# Patient Record
Sex: FEMALE | Race: WHITE | NOT HISPANIC OR LATINO | Employment: OTHER | URBAN - METROPOLITAN AREA
[De-identification: names, ages, dates, MRNs, and addresses within clinical notes are randomized per-mention and may not be internally consistent; named-entity substitution may affect disease eponyms.]

---

## 2017-03-28 ENCOUNTER — LAB (OUTPATIENT)
Dept: LAB | Age: 77
End: 2017-03-28
Payer: COMMERCIAL

## 2017-03-28 ENCOUNTER — TRANSCRIBE ORDERS (OUTPATIENT)
Dept: LAB | Age: 77
End: 2017-03-28

## 2017-03-28 DIAGNOSIS — I10 ESSENTIAL HYPERTENSION, MALIGNANT: Primary | ICD-10-CM

## 2017-03-28 DIAGNOSIS — E03.9 UNSPECIFIED HYPOTHYROIDISM: ICD-10-CM

## 2017-03-28 DIAGNOSIS — R73.09 OTHER ABNORMAL GLUCOSE: ICD-10-CM

## 2017-03-28 LAB
ALBUMIN SERPL BCP-MCNC: 3.5 G/DL (ref 3.5–5)
ALP SERPL-CCNC: 82 U/L (ref 46–116)
ALT SERPL W P-5'-P-CCNC: 23 U/L (ref 12–78)
ANION GAP SERPL CALCULATED.3IONS-SCNC: 6 MMOL/L (ref 4–13)
AST SERPL W P-5'-P-CCNC: 13 U/L (ref 5–45)
BASOPHILS # BLD AUTO: 0.06 THOUSANDS/ΜL (ref 0–0.1)
BASOPHILS NFR BLD AUTO: 1 % (ref 0–1)
BILIRUB SERPL-MCNC: 0.65 MG/DL (ref 0.2–1)
BUN SERPL-MCNC: 18 MG/DL (ref 5–25)
CALCIUM SERPL-MCNC: 9.2 MG/DL (ref 8.3–10.1)
CHLORIDE SERPL-SCNC: 102 MMOL/L (ref 100–108)
CHOLEST SERPL-MCNC: 186 MG/DL (ref 50–200)
CO2 SERPL-SCNC: 32 MMOL/L (ref 21–32)
CREAT SERPL-MCNC: 0.8 MG/DL (ref 0.6–1.3)
EOSINOPHIL # BLD AUTO: 0.27 THOUSAND/ΜL (ref 0–0.61)
EOSINOPHIL NFR BLD AUTO: 4 % (ref 0–6)
ERYTHROCYTE [DISTWIDTH] IN BLOOD BY AUTOMATED COUNT: 14.7 % (ref 11.6–15.1)
EST. AVERAGE GLUCOSE BLD GHB EST-MCNC: 128 MG/DL
GFR SERPL CREATININE-BSD FRML MDRD: >60 ML/MIN/1.73SQ M
GLUCOSE P FAST SERPL-MCNC: 105 MG/DL (ref 65–99)
HBA1C MFR BLD: 6.1 % (ref 4.2–6.3)
HCT VFR BLD AUTO: 42.2 % (ref 34.8–46.1)
HDLC SERPL-MCNC: 51 MG/DL (ref 40–60)
HGB BLD-MCNC: 14 G/DL (ref 11.5–15.4)
LDLC SERPL CALC-MCNC: 115 MG/DL (ref 0–100)
LYMPHOCYTES # BLD AUTO: 2.53 THOUSANDS/ΜL (ref 0.6–4.47)
LYMPHOCYTES NFR BLD AUTO: 37 % (ref 14–44)
MCH RBC QN AUTO: 30.6 PG (ref 26.8–34.3)
MCHC RBC AUTO-ENTMCNC: 33.2 G/DL (ref 31.4–37.4)
MCV RBC AUTO: 92 FL (ref 82–98)
MONOCYTES # BLD AUTO: 0.65 THOUSAND/ΜL (ref 0.17–1.22)
MONOCYTES NFR BLD AUTO: 10 % (ref 4–12)
NEUTROPHILS # BLD AUTO: 3.31 THOUSANDS/ΜL (ref 1.85–7.62)
NEUTS SEG NFR BLD AUTO: 48 % (ref 43–75)
NRBC BLD AUTO-RTO: 0 /100 WBCS
PLATELET # BLD AUTO: 339 THOUSANDS/UL (ref 149–390)
PMV BLD AUTO: 10.6 FL (ref 8.9–12.7)
POTASSIUM SERPL-SCNC: 4 MMOL/L (ref 3.5–5.3)
PROT SERPL-MCNC: 6.9 G/DL (ref 6.4–8.2)
RBC # BLD AUTO: 4.58 MILLION/UL (ref 3.81–5.12)
SODIUM SERPL-SCNC: 140 MMOL/L (ref 136–145)
TRIGL SERPL-MCNC: 101 MG/DL
TSH SERPL DL<=0.05 MIU/L-ACNC: 0.39 UIU/ML (ref 0.36–3.74)
WBC # BLD AUTO: 6.83 THOUSAND/UL (ref 4.31–10.16)

## 2017-03-28 PROCEDURE — 80053 COMPREHEN METABOLIC PANEL: CPT

## 2017-03-28 PROCEDURE — 36415 COLL VENOUS BLD VENIPUNCTURE: CPT

## 2017-03-28 PROCEDURE — 85025 COMPLETE CBC W/AUTO DIFF WBC: CPT

## 2017-03-28 PROCEDURE — 83036 HEMOGLOBIN GLYCOSYLATED A1C: CPT

## 2017-03-28 PROCEDURE — 84443 ASSAY THYROID STIM HORMONE: CPT

## 2017-03-28 PROCEDURE — 80061 LIPID PANEL: CPT

## 2017-04-10 ENCOUNTER — ALLSCRIPTS OFFICE VISIT (OUTPATIENT)
Dept: OTHER | Facility: OTHER | Age: 77
End: 2017-04-10

## 2017-04-10 DIAGNOSIS — R31.9 HEMATURIA: ICD-10-CM

## 2017-04-10 DIAGNOSIS — Z12.11 ENCOUNTER FOR SCREENING FOR MALIGNANT NEOPLASM OF COLON: ICD-10-CM

## 2017-04-10 DIAGNOSIS — R30.0 DYSURIA: ICD-10-CM

## 2017-04-10 DIAGNOSIS — Z12.31 ENCOUNTER FOR SCREENING MAMMOGRAM FOR MALIGNANT NEOPLASM OF BREAST: ICD-10-CM

## 2017-04-10 DIAGNOSIS — Z00.00 ENCOUNTER FOR GENERAL ADULT MEDICAL EXAMINATION WITHOUT ABNORMAL FINDINGS: ICD-10-CM

## 2017-04-10 LAB
BILIRUB UR QL STRIP: ABNORMAL
CLARITY UR: ABNORMAL
COLOR UR: CLEAR
GLUCOSE (HISTORICAL): ABNORMAL
HGB UR QL STRIP.AUTO: ABNORMAL
KETONES UR STRIP-MCNC: ABNORMAL MG/DL
LEUKOCYTE ESTERASE UR QL STRIP: ABNORMAL
NITRITE UR QL STRIP: ABNORMAL
PH UR STRIP.AUTO: 6 [PH]
PROT UR STRIP-MCNC: ABNORMAL MG/DL
SP GR UR STRIP.AUTO: 1.01
UROBILINOGEN UR QL STRIP.AUTO: 0.2

## 2017-04-13 ENCOUNTER — ALLSCRIPTS OFFICE VISIT (OUTPATIENT)
Dept: OTHER | Facility: OTHER | Age: 77
End: 2017-04-13

## 2017-04-17 ENCOUNTER — ALLSCRIPTS OFFICE VISIT (OUTPATIENT)
Dept: OTHER | Facility: OTHER | Age: 77
End: 2017-04-17

## 2017-04-19 ENCOUNTER — HOSPITAL ENCOUNTER (OUTPATIENT)
Dept: RADIOLOGY | Age: 77
Discharge: HOME | End: 2017-04-19
Payer: COMMERCIAL

## 2017-04-19 ENCOUNTER — APPOINTMENT (OUTPATIENT)
Dept: LAB | Age: 77
End: 2017-04-19
Payer: COMMERCIAL

## 2017-04-19 ENCOUNTER — TRANSCRIBE ORDERS (OUTPATIENT)
Dept: ADMINISTRATIVE | Age: 77
End: 2017-04-19

## 2017-04-19 DIAGNOSIS — R31.9 HEMATURIA: ICD-10-CM

## 2017-04-19 DIAGNOSIS — Z12.31 ENCOUNTER FOR SCREENING MAMMOGRAM FOR MALIGNANT NEOPLASM OF BREAST: ICD-10-CM

## 2017-04-19 LAB
BACTERIA UR QL AUTO: ABNORMAL /HPF
BILIRUB UR QL STRIP: ABNORMAL
CLARITY UR: ABNORMAL
COLOR UR: ABNORMAL
GLUCOSE UR STRIP-MCNC: NEGATIVE MG/DL
HGB UR QL STRIP.AUTO: NEGATIVE
HYALINE CASTS #/AREA URNS LPF: ABNORMAL /LPF
KETONES UR STRIP-MCNC: ABNORMAL MG/DL
LEUKOCYTE ESTERASE UR QL STRIP: ABNORMAL
MUCOUS THREADS UR QL AUTO: ABNORMAL
NITRITE UR QL STRIP: NEGATIVE
NON-SQ EPI CELLS URNS QL MICRO: ABNORMAL /HPF
PH UR STRIP.AUTO: 6.5 [PH] (ref 4.5–8)
PROT UR STRIP-MCNC: ABNORMAL MG/DL
RBC #/AREA URNS AUTO: ABNORMAL /HPF
SP GR UR STRIP.AUTO: 1.02 (ref 1–1.03)
UROBILINOGEN UR QL STRIP.AUTO: 1 E.U./DL
WBC #/AREA URNS AUTO: ABNORMAL /HPF

## 2017-04-19 PROCEDURE — G0202 SCR MAMMO BI INCL CAD: HCPCS

## 2017-04-19 PROCEDURE — 81001 URINALYSIS AUTO W/SCOPE: CPT

## 2017-06-16 ENCOUNTER — APPOINTMENT (EMERGENCY)
Dept: ULTRASOUND IMAGING | Facility: HOSPITAL | Age: 77
End: 2017-06-16
Payer: COMMERCIAL

## 2017-06-16 ENCOUNTER — OFFICE VISIT (OUTPATIENT)
Dept: URGENT CARE | Age: 77
End: 2017-06-16
Payer: COMMERCIAL

## 2017-06-16 ENCOUNTER — HOSPITAL ENCOUNTER (EMERGENCY)
Facility: HOSPITAL | Age: 77
Discharge: HOME | End: 2017-06-16
Attending: EMERGENCY MEDICINE | Admitting: EMERGENCY MEDICINE
Payer: COMMERCIAL

## 2017-06-16 VITALS
DIASTOLIC BLOOD PRESSURE: 99 MMHG | HEART RATE: 74 BPM | TEMPERATURE: 98.2 F | RESPIRATION RATE: 16 BRPM | SYSTOLIC BLOOD PRESSURE: 219 MMHG | OXYGEN SATURATION: 95 %

## 2017-06-16 DIAGNOSIS — M79.606 LEG PAIN: Primary | ICD-10-CM

## 2017-06-16 LAB
ANION GAP SERPL CALCULATED.3IONS-SCNC: 10 MMOL/L (ref 4–13)
APTT PPP: 26 SECONDS (ref 23–35)
BASOPHILS # BLD AUTO: 0.05 THOUSANDS/ΜL (ref 0–0.1)
BASOPHILS NFR BLD AUTO: 1 % (ref 0–1)
BUN SERPL-MCNC: 21 MG/DL (ref 5–25)
CALCIUM SERPL-MCNC: 9.6 MG/DL (ref 8.3–10.1)
CHLORIDE SERPL-SCNC: 100 MMOL/L (ref 100–108)
CO2 SERPL-SCNC: 29 MMOL/L (ref 21–32)
CREAT SERPL-MCNC: 0.84 MG/DL (ref 0.6–1.3)
EOSINOPHIL # BLD AUTO: 0.22 THOUSAND/ΜL (ref 0–0.61)
EOSINOPHIL NFR BLD AUTO: 2 % (ref 0–6)
ERYTHROCYTE [DISTWIDTH] IN BLOOD BY AUTOMATED COUNT: 14.9 % (ref 11.6–15.1)
GFR SERPL CREATININE-BSD FRML MDRD: >60 ML/MIN/1.73SQ M
GLUCOSE SERPL-MCNC: 130 MG/DL (ref 65–140)
HCT VFR BLD AUTO: 42.4 % (ref 34.8–46.1)
HGB BLD-MCNC: 14.3 G/DL (ref 11.5–15.4)
INR PPP: 0.88 (ref 0.86–1.16)
LYMPHOCYTES # BLD AUTO: 3.08 THOUSANDS/ΜL (ref 0.6–4.47)
LYMPHOCYTES NFR BLD AUTO: 33 % (ref 14–44)
MCH RBC QN AUTO: 30.6 PG (ref 26.8–34.3)
MCHC RBC AUTO-ENTMCNC: 33.7 G/DL (ref 31.4–37.4)
MCV RBC AUTO: 91 FL (ref 82–98)
MONOCYTES # BLD AUTO: 0.87 THOUSAND/ΜL (ref 0.17–1.22)
MONOCYTES NFR BLD AUTO: 9 % (ref 4–12)
NEUTROPHILS # BLD AUTO: 5.23 THOUSANDS/ΜL (ref 1.85–7.62)
NEUTS SEG NFR BLD AUTO: 55 % (ref 43–75)
PLATELET # BLD AUTO: 311 THOUSANDS/UL (ref 149–390)
PMV BLD AUTO: 9.7 FL (ref 8.9–12.7)
POTASSIUM SERPL-SCNC: 3.7 MMOL/L (ref 3.5–5.3)
PROTHROMBIN TIME: 12.2 SECONDS (ref 12.1–14.4)
RBC # BLD AUTO: 4.67 MILLION/UL (ref 3.81–5.12)
SODIUM SERPL-SCNC: 139 MMOL/L (ref 136–145)
WBC # BLD AUTO: 9.45 THOUSAND/UL (ref 4.31–10.16)

## 2017-06-16 PROCEDURE — 80048 BASIC METABOLIC PNL TOTAL CA: CPT | Performed by: EMERGENCY MEDICINE

## 2017-06-16 PROCEDURE — 85610 PROTHROMBIN TIME: CPT | Performed by: EMERGENCY MEDICINE

## 2017-06-16 PROCEDURE — 99213 OFFICE O/P EST LOW 20 MIN: CPT | Performed by: FAMILY MEDICINE

## 2017-06-16 PROCEDURE — 36415 COLL VENOUS BLD VENIPUNCTURE: CPT | Performed by: EMERGENCY MEDICINE

## 2017-06-16 PROCEDURE — 85730 THROMBOPLASTIN TIME PARTIAL: CPT | Performed by: EMERGENCY MEDICINE

## 2017-06-16 PROCEDURE — 99284 EMERGENCY DEPT VISIT MOD MDM: CPT

## 2017-06-16 PROCEDURE — 85025 COMPLETE CBC W/AUTO DIFF WBC: CPT | Performed by: EMERGENCY MEDICINE

## 2017-06-16 PROCEDURE — 93971 EXTREMITY STUDY: CPT

## 2017-06-16 RX ORDER — AMLODIPINE BESYLATE 10 MG/1
10 TABLET ORAL DAILY
COMMUNITY
End: 2018-02-28 | Stop reason: SDUPTHER

## 2017-06-16 RX ORDER — ATENOLOL AND CHLORTHALIDONE TABLET 50; 25 MG/1; MG/1
1 TABLET ORAL DAILY
COMMUNITY
End: 2018-02-28 | Stop reason: SDUPTHER

## 2017-06-16 RX ORDER — LEVOTHYROXINE SODIUM 0.12 MG/1
125 TABLET ORAL DAILY
COMMUNITY
End: 2018-02-07

## 2017-06-21 ENCOUNTER — ALLSCRIPTS OFFICE VISIT (OUTPATIENT)
Dept: OTHER | Facility: OTHER | Age: 77
End: 2017-06-21

## 2017-07-13 ENCOUNTER — ALLSCRIPTS OFFICE VISIT (OUTPATIENT)
Dept: OTHER | Facility: OTHER | Age: 77
End: 2017-07-13

## 2017-10-06 DIAGNOSIS — R73.03 PREDIABETES: ICD-10-CM

## 2017-10-19 ENCOUNTER — APPOINTMENT (OUTPATIENT)
Dept: LAB | Age: 77
End: 2017-10-19
Payer: COMMERCIAL

## 2017-10-19 ENCOUNTER — TRANSCRIBE ORDERS (OUTPATIENT)
Dept: LAB | Age: 77
End: 2017-10-19

## 2017-10-19 DIAGNOSIS — R73.03 PREDIABETES: ICD-10-CM

## 2017-10-19 LAB
EST. AVERAGE GLUCOSE BLD GHB EST-MCNC: 117 MG/DL
HBA1C MFR BLD: 5.7 % (ref 4.2–6.3)

## 2017-10-19 PROCEDURE — 36415 COLL VENOUS BLD VENIPUNCTURE: CPT

## 2017-10-19 PROCEDURE — 83036 HEMOGLOBIN GLYCOSYLATED A1C: CPT

## 2017-10-25 ENCOUNTER — ALLSCRIPTS OFFICE VISIT (OUTPATIENT)
Dept: OTHER | Facility: OTHER | Age: 77
End: 2017-10-25

## 2017-10-26 NOTE — PROGRESS NOTES
(V76 12) (Z12 31)   8  Flu vaccine need (V04 81) (Z23)   9  Hematuria (599 70) (R31 9)   10  Hypertension (401 9) (I10)   11  Hypothyroidism (244 9) (E03 9)   12  Left foot pain (729 5) (M79 672)   13  Pes anserinus tendinitis of right lower extremity (726 61) (M76 891)   14  Plantar fasciitis (728 71) (M72 2)   15  Post-menopausal (V49 81) (Z78 0)   16  Pre-diabetes (790 29) (R73 03)   17  Primary osteoarthritis of right knee (715 16) (M17 11)   18  Psoriasis (696 1) (L40 9)   19  Right calf pain (729 5) (M79 661)   20  Routine history and physical examination of adult (V70 0) (Z00 00)   21  Screening for osteoporosis (V82 81) (Z13 820)   22  Skin cancer (173 90) (C44 90)   23  Urinary tract infection (599 0) (N39 0)    Past Medical History  1  Former smoker (V15 82) (W37 848)   2  History of Need for 23-polyvalent pneumococcal polysaccharide vaccine (V03 82) (Z23)   3  History of Need for prophylactic vaccination and inoculation against influenza (V04 81)   (Z23)   4  History of Screening for disorder of blood and blood-forming organs (V78 9) (Z13 0)    Surgical History  1  History of Blepharoplasty Upper Lid W/ Excessive Skin   2  History of Cholecystectomy   3  History of Thyroid Surgery Total Thyroidectomy    Family History  Mother    1  Family history of Arthritis (V17 7)   2  Family history of Breast Cancer (V16 3)   3  Family history of Family Health Status Of Mother - Alive   4  Family history of Hypertension (V17 49)   5  Family history of Osteoporosis (V17 81)  Father    10  Family history of Family Health Status Of Father -    9  Family history of Lung Cancer (V16 1)    Social History   · Being A Social Drinker   · Caffeine Use   · Never a smoker    Current Meds   1  ALPRAZolam 0 25 MG Oral Tablet; take 1/2 to 1 tablet tid prn; Therapy: 59Jug0469 to (Last Rx:30Oon8990) Ordered   2  AmLODIPine Besylate 10 MG Oral Tablet; TAKE 1 TABLET DAILY;    Therapy: 14QQX5921 to (Yary Samson)  Requested for: 84TQN8876; Last   ZZ:72EPM3492 Ordered   3  Atenolol-Chlorthalidone 50-25 MG Oral Tablet; TAKE 1 TABLET DAILY AS DIRECTED; Therapy: 50Gbh0083 to (Evaluate:17Mar2018)  Requested for: 55Cll9703; Last   Rx:83Yub7534 Ordered   4  Clobetasol Propionate 0 05 % External Ointment; OINT EX X 21;   Therapy: 34Iqs2416-  Requested for: 81Ymz8128; Status: NEED INFORMATION Ordered   5  Levothyroxine Sodium 112 MCG Oral Tablet; TAKE ONE TABLET BY MOUTH ONCE DAILY   AS DIRECTED; Therapy: 00OYM4868 to (Evaluate:04Hou4927)  Requested for: 75Nah7663; Last   Rx:41Cin3299 Ordered   6  Multi Vitamin/Minerals Oral Tablet; Take 1 tablet daily Recorded   7  Naproxen 500 MG Oral Tablet; Take 1 tablet twice daily as needed; Therapy: 60JOY6134 to (Last Rx:87Jyf6794)  Requested for: 35Aae4302 Ordered   8  Vitamin C CAPS; Therapy: (Recorded:25Oct2017) to Recorded    Allergies  1  Amoxicillin CAPS    Vitals  Vital Signs    Recorded: 47JYB3044 01:55PM Recorded: 94RMR2558 11:03AM   Heart Rate  58   Respiration  16   Systolic 214,    Diastolic 82, LUE 70   Height  5 ft 5 in   Weight  179 lb 4 oz   BMI Calculated  29 83   BSA Calculated  1 89     Physical Exam    Constitutional   General appearance: No acute distress, well appearing and well nourished  Neck   Neck: Supple, symmetric, trachea midline, no masses  Thyroid: Normal, no thyromegaly  Pulmonary   Respiratory effort: No increased work of breathing or signs of respiratory distress  Auscultation of lungs: Clear to auscultation  Cardiovascular   Auscultation of heart: Normal rate and rhythm, normal S1 and S2, no murmurs  Carotid pulses: 2+ bilaterally  Abdominal aorta: Normal     Femoral pulses: 2+ bilaterally  Pedal pulses: 2+ bilaterally  Peripheral vascular exam: Normal     Examination of extremities for edema and/or varicosities: Normal     Abdomen   Abdomen: Non-tender, no masses  Liver and spleen: No hepatomegaly or splenomegaly  Lymphatic   Palpation of lymph nodes in neck: No lymphadenopathy  Palpation of lymph nodes in groin: No lymphadenopathy  Results/Data  (1) HEMOGLOBIN A1C 19Oct2017 08:42AM Lizeth Carrier Order Number: [id number]     Test Name Result Flag Reference   HEMOGLOBIN A1C 5 7 %  4 2-6 3   EST  AVG  GLUCOSE 117 mg/dl       Health Management  Health Maintenance   Medicare Annual Wellness Visit; every 1 year; Next Due: 02COV0899; Overdue    Future Appointments    Date/Time Provider Specialty Site   04/18/2018 01:30 PM KRYSTYNA Noe   [facility]     Signatures   Electronically signed by : KRYSTYNA Cruz ; Oct 25 2017  2:17PM EST                       (Author)

## 2017-12-01 ENCOUNTER — GENERIC CONVERSION - ENCOUNTER (OUTPATIENT)
Dept: FAMILY MEDICINE CLINIC | Facility: MEDICAL CENTER | Age: 77
End: 2017-12-01

## 2017-12-08 ENCOUNTER — TRANSCRIBE ORDERS (OUTPATIENT)
Dept: ADMINISTRATIVE | Facility: HOSPITAL | Age: 77
End: 2017-12-08

## 2017-12-08 ENCOUNTER — LAB (OUTPATIENT)
Dept: LAB | Age: 77
End: 2017-12-08
Payer: COMMERCIAL

## 2017-12-08 ENCOUNTER — TRANSCRIBE ORDERS (OUTPATIENT)
Dept: LAB | Age: 77
End: 2017-12-08

## 2017-12-08 ENCOUNTER — ALLSCRIPTS OFFICE VISIT (OUTPATIENT)
Dept: OTHER | Facility: OTHER | Age: 77
End: 2017-12-08

## 2017-12-08 DIAGNOSIS — E03.9 HYPOTHYROIDISM: ICD-10-CM

## 2017-12-08 DIAGNOSIS — I10 ESSENTIAL (PRIMARY) HYPERTENSION: ICD-10-CM

## 2017-12-08 DIAGNOSIS — R01.1 CARDIAC MURMUR: ICD-10-CM

## 2017-12-08 DIAGNOSIS — R01.1 UNDIAGNOSED CARDIAC MURMURS: Primary | ICD-10-CM

## 2017-12-08 LAB
ANION GAP SERPL CALCULATED.3IONS-SCNC: 6 MMOL/L (ref 4–13)
BUN SERPL-MCNC: 22 MG/DL (ref 5–25)
CALCIUM SERPL-MCNC: 9.6 MG/DL (ref 8.3–10.1)
CHLORIDE SERPL-SCNC: 97 MMOL/L (ref 100–108)
CO2 SERPL-SCNC: 30 MMOL/L (ref 21–32)
CREAT SERPL-MCNC: 0.8 MG/DL (ref 0.6–1.3)
GFR SERPL CREATININE-BSD FRML MDRD: 71 ML/MIN/1.73SQ M
GLUCOSE SERPL-MCNC: 120 MG/DL (ref 65–140)
POTASSIUM SERPL-SCNC: 3.6 MMOL/L (ref 3.5–5.3)
SODIUM SERPL-SCNC: 133 MMOL/L (ref 136–145)
T4 FREE SERPL-MCNC: 1.4 NG/DL (ref 0.76–1.46)
TSH SERPL DL<=0.05 MIU/L-ACNC: 0.41 UIU/ML (ref 0.36–3.74)

## 2017-12-08 PROCEDURE — 36415 COLL VENOUS BLD VENIPUNCTURE: CPT

## 2017-12-08 PROCEDURE — 84439 ASSAY OF FREE THYROXINE: CPT

## 2017-12-08 PROCEDURE — 80048 BASIC METABOLIC PNL TOTAL CA: CPT

## 2017-12-08 PROCEDURE — 84443 ASSAY THYROID STIM HORMONE: CPT

## 2017-12-09 NOTE — PROGRESS NOTES
Assessment    1  Shakiness (781 0) (R25 1)   2  Cardiac murmur (785 2) (R01 1)   3  Hypothyroidism (244 9) (E03 9)   4  Hypertension (401 9) (I10)   5  Abnormal EKG (794 31) (R94 31)    Plan  Cardiac murmur    · ECHO COMPLETE WITH CONTRAST IF INDICATED; Status:Need Information -Financial Authorization; Requested for:91Wvh4126;   Cardiac murmur, Hypertension, Hypothyroidism, Shakiness    · EKG/ECG- POC; Status:Complete;   Done: 31YRV9430 12:00AM   · Follow-up visit in 1 month Evaluation and Treatment  Follow-up  Status: Complete  Done:44Lsd6349  Hypertension, Hypothyroidism    · (1) BASIC METABOLIC PROFILE; Status:Active; Requested for:71Ocy0725;    · (1) T4, FREE; Status:Active; Requested for:09Ezv1723;    · (1) TSH; Status:Active; Requested for:87Fox6665;     Discussion/Summary    New onset and requires workup  Etiology unclear  EKG in the office with some abnormalities but no acute abnormalities to suggest that this is a heart attack  Patient has a murmur on exam but unclear if it is related to her symptoms  I will have her get an echo for evaluation  Patient has hypothyroidism and hypertension as well  Will have her get a basic metabolic profile and thyroid labs for evaluation  I recommended she try the alprazolam that was prescribed in the past to see if it helps her symptoms as she does have a diagnosis of anxiety  Follow up with PCP in 1 month or sooner if needed  Possible side effects of new medications were reviewed with the patient/guardian today  The treatment plan was reviewed with the patient/guardian  The patient/guardian understands and agrees with the treatment plan      Chief Complaint  Patient is here today with complaints of episodes of shakiness and feeling off  She had two episodes in the next month  History of Present Illness  HPI: Patient presents for follow-up  Has complaint of shakiness  Started 2 days ago  States she became shaky the 1st time while she was grocery shopping   Ate a mint and felt better  Yesterday she did not have the same feeling but did get the shakiness this morning again  States she ate breakfast and felt better  Denies any chest pain or trouble breathing  Symptoms are stable  Nothing seems to improve or exacerbate the shakiness  Patient states she was seen by Dr Prince Tompkins in the past similar symptoms and placed on alprazolam but does not want to take it  Patient believes shakiness may be brought upon by anxiety  Review of Systems   Constitutional: no fever  Cardiovascular: no chest pain  Respiratory: no shortness of breath  Active Problems  1  Anxiety (300 00) (F41 9)   2  Arthritis (716 90) (M19 90)   3  Calf pain (729 5) (M79 669)   4  Colon cancer screening (V76 51) (Z12 11)   5  Decreased hearing (389 9) (H91 90)   6  Dysuria (788 1) (R30 0)   7  Encounter for mammogram to establish baseline mammogram (V76 12) (Z12 31)   8  Flu vaccine need (V04 81) (Z23)   9  Hematuria (599 70) (R31 9)   10  Hypertension (401 9) (I10)   11  Hypothyroidism (244 9) (E03 9)   12  Left foot pain (729 5) (M79 672)   13  Need for vaccination with 13-polyvalent pneumococcal conjugate vaccine (V03 82) (Z23)   14  Pes anserinus tendinitis of right lower extremity (726 61) (M76 891)   15  Plantar fasciitis (728 71) (M72 2)   16  Post-menopausal (V49 81) (Z78 0)   17  Pre-diabetes (790 29) (R73 03)   18  Primary osteoarthritis of right knee (715 16) (M17 11)   19  Psoriasis (696 1) (L40 9)   20  Right calf pain (729 5) (M79 661)   21  Routine history and physical examination of adult (V70 0) (Z00 00)   22  Screening for osteoporosis (V82 81) (Z13 820)   23  Skin cancer (173 90) (C44 90)   24  Urinary tract infection (599 0) (N39 0)    Past Medical History    1  Former smoker (V15 82) (D00 237)   2  History of Need for 23-polyvalent pneumococcal polysaccharide vaccine (V03 82) (Z23)   3  History of Need for prophylactic vaccination and inoculation against influenza (V04 81) (Z78)   4  History of Screening for disorder of blood and blood-forming organs (V78 9) (Z13 0)  Active Problems And Past Medical History Reviewed: The active problems and past medical history were reviewed and updated today  Family History  Mother    1  Family history of Arthritis (V17 7)   2  Family history of Breast Cancer (V16 3)   3  Family history of Family Health Status Of Mother - Alive   4  Family history of Hypertension (V17 49)   5  Family history of Osteoporosis (V17 81)  Father    10  Family history of Family Health Status Of Father -    9  Family history of Lung Cancer (V16 1)    Social History   · Being A Social Drinker   · Caffeine Use   · Never a smoker    Surgical History    1  History of Blepharoplasty Upper Lid W/ Excessive Skin   2  History of Cholecystectomy   3  History of Thyroid Surgery Total Thyroidectomy    Current Meds   1  AmLODIPine Besylate 10 MG Oral Tablet; TAKE 1 TABLET DAILY; Therapy: 92RJZ0757 to (Chris Matute)  Requested for: 63CCK9162; Last Rx:2017 Ordered   2  Atenolol-Chlorthalidone 50-25 MG Oral Tablet; TAKE 1 TABLET DAILY AS DIRECTED; Therapy: 04Usj5802 to (Evaluate:2018)  Requested for: 75Foo7809; Last Rx:56Pdy5261 Ordered   3  Clobetasol Propionate 0 05 % External Ointment; OINT EX X 21; Therapy: 91Tho4211-  Requested for: 00Osn1362; Status: NEED INFORMATION Ordered   4  Levothyroxine Sodium 112 MCG Oral Tablet; TAKE ONE TABLET BY MOUTH ONCE DAILY AS DIRECTED; Therapy: 69VWW0374 to (Evaluate:01Ucj5334)  Requested for: 27Ijh6529; Last Rx:85Dli8065 Ordered   5  Multi Vitamin/Minerals Oral Tablet; Take 1 tablet daily Recorded   6  Naproxen 500 MG Oral Tablet; Take 1 tablet twice daily as needed; Therapy: 66DCV8972 to (Last Rx:88Ozn2774)  Requested for: 21Zya1793 Ordered   7  Vitamin C CAPS; Therapy: (Recorded:2017) to Recorded    The medication list was reviewed and updated today  Allergies  1   Amoxicillin CAPS    Vitals   Recorded: 84RXT1089 10: 40AM   Heart Rate 64   Respiration 18   Systolic 793   Diastolic 76   Weight 576 lb 6 oz   BMI Calculated 29 85   BSA Calculated 1 89       Physical Exam   Constitutional  General appearance: No acute distress, well appearing and well nourished  Eyes  Conjunctiva and lids: No swelling, erythema or discharge  Pupils and irises: Equal, round and reactive to light  Ears, Nose, Mouth, and Throat  External inspection of ears and nose: Normal    Otoscopic examination: Tympanic membranes translucent with normal light reflex  Canals patent without erythema  Nasal mucosa, septum, and turbinates: Normal without edema or erythema  Oropharynx: Normal with no erythema, edema, exudate or lesions  Pulmonary  Respiratory effort: No increased work of breathing or signs of respiratory distress  Auscultation of lungs: Clear to auscultation  Cardiovascular  Auscultation of heart: Abnormal   The heart rate was normal  The rhythm was regular  Heart sounds: normal S1-- and-- normal S2  A grade 2 systolic murmur was heard at the LUSB  Examination of extremities for edema and/or varicosities: Normal    Lymphatic  Palpation of lymph nodes in neck: No lymphadenopathy  Musculoskeletal  Gait and station: Normal    Neurologic  Cranial nerves: Cranial nerves 2-12 intact  Psychiatric  Orientation to person, place, and time: Normal    Mood and affect: Normal          Results/Data  EKG/ECG- POC 18ZTD3084 12:00AM Marlen Childers     Test Name Result Flag Reference   EKG/ECG 12/8/17         Summary / No summary entered :    No summary entered  Documents attached :    Sumaya Gregory; Enc: [id number] - Appointment - Marlen Childers - [facility]) (Result Document)    Future Appointments    Date/Time Provider Specialty Site   01/09/2018 10:15 AM KRYSTYNA Oliver  [address]   04/18/2018 01:30 PM KRYSTYNA Oliver   [address]       Signatures   Electronically signed by : Moira Olea DO; Dec  8 2017 12:26PM EST                       (Author)

## 2017-12-11 ENCOUNTER — GENERIC CONVERSION - ENCOUNTER (OUTPATIENT)
Dept: OTHER | Facility: OTHER | Age: 77
End: 2017-12-11

## 2017-12-11 ENCOUNTER — HOSPITAL ENCOUNTER (EMERGENCY)
Facility: HOSPITAL | Age: 77
Discharge: HOME | End: 2017-12-11
Attending: EMERGENCY MEDICINE | Admitting: EMERGENCY MEDICINE
Payer: COMMERCIAL

## 2017-12-11 VITALS
DIASTOLIC BLOOD PRESSURE: 72 MMHG | OXYGEN SATURATION: 99 % | RESPIRATION RATE: 16 BRPM | SYSTOLIC BLOOD PRESSURE: 173 MMHG | HEART RATE: 61 BPM | WEIGHT: 173 LBS | TEMPERATURE: 97.9 F

## 2017-12-11 DIAGNOSIS — R00.2 HEART PALPITATIONS: Primary | ICD-10-CM

## 2017-12-11 PROCEDURE — 93005 ELECTROCARDIOGRAM TRACING: CPT | Performed by: EMERGENCY MEDICINE

## 2017-12-11 PROCEDURE — 99284 EMERGENCY DEPT VISIT MOD MDM: CPT

## 2017-12-12 LAB
ATRIAL RATE: 66 BPM
P AXIS: 63 DEGREES
PR INTERVAL: 188 MS
QRS AXIS: -10 DEGREES
QRSD INTERVAL: 88 MS
QT INTERVAL: 418 MS
QTC INTERVAL: 438 MS
T WAVE AXIS: 43 DEGREES
VENTRICULAR RATE: 66 BPM

## 2017-12-12 NOTE — DISCHARGE INSTRUCTIONS
Anxiety   WHAT YOU NEED TO KNOW:   Anxiety is a condition that causes you to feel extremely worried or nervous  The feelings are so strong that they can cause problems with your daily activities or sleep  Anxiety may be triggered by something you fear, or it may happen without a cause  Family or work stress, smoking, caffeine, and alcohol can increase your risk for anxiety  Certain medicines or health conditions can also increase your risk  Anxiety can become a long-term condition if it is not managed or treated  DISCHARGE INSTRUCTIONS:   Call 911 if:   · You have chest pain, tightness, or heaviness that may spread to your shoulders, arms, jaw, neck, or back  · You feel like hurting yourself or someone else  Contact your healthcare provider if:   · Your symptoms get worse or do not get better with treatment  · Your anxiety keeps you from doing your regular daily activities  · You have new symptoms since your last visit  · You have questions or concerns about your condition or care  Medicines:   · Medicines  may be given to help you feel more calm and relaxed, and decrease your symptoms  · Take your medicine as directed  Contact your healthcare provider if you think your medicine is not helping or if you have side effects  Tell him of her if you are allergic to any medicine  Keep a list of the medicines, vitamins, and herbs you take  Include the amounts, and when and why you take them  Bring the list or the pill bottles to follow-up visits  Carry your medicine list with you in case of an emergency  Follow up with your healthcare provider within 2 weeks or as directed:  Write down your questions so you remember to ask them during your visits  Manage anxiety:   · Talk to someone about your anxiety  Your healthcare provider may suggest counseling  Cognitive behavioral therapy can help you understand and change how you react to events that trigger your symptoms   You might feel more comfortable talking with a friend or family member about your anxiety  Choose someone you know will be supportive and encouraging  · Find ways to relax  Activities such as exercise, meditation, or listening to music can help you relax  Spend time with friends, or do things you enjoy  · Practice deep breathing  Deep breathing can help you relax when you feel anxious  Focus on taking slow, deep breaths several times a day, or during an anxiety attack  Breathe in through your nose and out through your mouth  · Create a regular sleep routine  Regular sleep can help you feel calmer during the day  Go to sleep and wake up at the same times every day  Do not watch television or use the computer right before bed  Your room should be comfortable, dark, and quiet  · Eat a variety of healthy foods  Healthy foods include fruits, vegetables, low-fat dairy products, lean meats, fish, whole-grain breads, and cooked beans  Healthy foods can help you feel less anxious and have more energy  · Exercise regularly  Exercise can increase your energy level  Exercise may also lift your mood and help you sleep better  Your healthcare provider can help you create an exercise plan  · Do not smoke  Nicotine and other chemicals in cigarettes and cigars can increase anxiety  Ask your healthcare provider for information if you currently smoke and need help to quit  E-cigarettes or smokeless tobacco still contain nicotine  Talk to your healthcare provider before you use these products  · Do not have caffeine  Caffeine can make your symptoms worse  Do not have foods or drinks that are meant to increase your energy level  · Limit or do not drink alcohol  Ask your healthcare provider if alcohol is safe for you  You may not be able to drink alcohol if you take certain anxiety or depression medicines  Limit alcohol to 1 drink per day if you are a woman  Limit alcohol to 2 drinks per day if you are a man   A drink of alcohol is 12 ounces of beer, 5 ounces of wine, or 1½ ounces of liquor  · Do not use drugs  Drugs can make your anxiety worse  It can also make anxiety hard to manage  Talk to your healthcare provider if you use drugs and want help to quit  © 2017 [employer] [employer] Information is for End User's use only and may not be sold, redistributed or otherwise used for commercial purposes  All illustrations and images included in CareNotes® are the copyrighted property of A D A M , Mikey  or Justin Lunsford  The above information is an  only  It is not intended as medical advice for individual conditions or treatments  Talk to your doctor, nurse or pharmacist before following any medical regimen to see if it is safe and effective for you

## 2017-12-12 NOTE — ED PROVIDER NOTES
History  Chief Complaint   Patient presents with    Dizziness     Patient reports weakness and dizziness since Wednesday  Was seen at pcp and had blood work and ekg done earlier in the week  Patient here for eval because it is not going away  "It comes in waves  "       23-year-old female presents with intermittent waves of palpitations  Chem associated with feeling anxious  Buzz Mcqueen under large amount of stress recently, symptoms last 1-2 seconds then fully resolved, worse when she is thinking about stressful times, has a echocardiogram scheduled next week, is extremely anxious about this  Has been prescribed anxiety medication has not taken any  History provided by:  Patient      Prior to Admission Medications   Prescriptions Last Dose Informant Patient Reported? Taking? amLODIPine (NORVASC) 10 mg tablet   Yes Yes   Sig: Take 10 mg by mouth daily   atenolol-chlorthalidone (TENORETIC) 50-25 mg per tablet   Yes Yes   Sig: Take 1 tablet by mouth daily   levothyroxine 125 mcg tablet   Yes Yes   Sig: Take 125 mcg by mouth daily      Facility-Administered Medications: None       Past Medical History:   Diagnosis Date    Disease of thyroid gland     Hypertension     Psoriasis     Skin cancer        Past Surgical History:   Procedure Laterality Date    CHOLECYSTECTOMY      SKIN BIOPSY      THYROID LOBECTOMY         History reviewed  No pertinent family history  I have reviewed and agree with the history as documented  Social History   Substance Use Topics    Smoking status: Former Smoker    Smokeless tobacco: Not on file    Alcohol use Yes      Comment: socially        Review of Systems   Constitutional: Negative for activity change, chills, diaphoresis and fever  HENT: Negative for congestion, sinus pressure and sore throat  Eyes: Negative for pain and visual disturbance  Respiratory: Negative for cough, chest tightness, shortness of breath, wheezing and stridor      Cardiovascular: Negative for chest pain and palpitations  Gastrointestinal: Negative for abdominal distention, abdominal pain, constipation, diarrhea, nausea and vomiting  Genitourinary: Negative for dysuria and frequency  Musculoskeletal: Negative for neck pain and neck stiffness  Skin: Negative for rash  Neurological: Negative for dizziness, speech difficulty, light-headedness, numbness and headaches  Physical Exam  ED Triage Vitals   Temperature Pulse Respirations Blood Pressure SpO2   12/11/17 1750 12/11/17 1749 12/11/17 1749 12/11/17 1750 12/11/17 1749   97 9 °F (36 6 °C) 76 19 (!) 193/85 99 %      Temp Source Heart Rate Source Patient Position - Orthostatic VS BP Location FiO2 (%)   12/11/17 1750 12/11/17 1749 12/11/17 1749 12/11/17 1749 --   Oral Monitor Lying Left arm       Pain Score       12/11/17 1749       No Pain           Orthostatic Vital Signs  Vitals:    12/11/17 1749 12/11/17 1750 12/11/17 2007   BP:  (!) 193/85 (!) 173/72   Pulse: 76  61   Patient Position - Orthostatic VS: Lying Sitting Sitting       Physical Exam   Constitutional: She is oriented to person, place, and time  She appears well-developed  No distress  HENT:   Head: Normocephalic and atraumatic  Eyes: Pupils are equal, round, and reactive to light  Neck: Normal range of motion  Neck supple  No tracheal deviation present  Cardiovascular: Normal rate, regular rhythm, normal heart sounds and intact distal pulses  No murmur heard  Pulmonary/Chest: Effort normal and breath sounds normal  No stridor  No respiratory distress  Abdominal: Soft  She exhibits no distension  There is no tenderness  There is no rebound and no guarding  Musculoskeletal: Normal range of motion  Neurological: She is alert and oriented to person, place, and time  Skin: Skin is warm and dry  She is not diaphoretic  No erythema  No pallor  Psychiatric: Her mood appears anxious  Vitals reviewed        ED Medications  Medications - No data to display    Diagnostic Studies  Results Reviewed     None                 No orders to display              Procedures  ECG 12 Lead Documentation  Date/Time: 12/11/2017 8:35 PM  Performed by: Richie Alfaro by: Stefany Us     ECG reviewed by me, the ED Provider: yes    Patient location:  ED  Previous ECG:     Previous ECG:  Unavailable  Interpretation:     Interpretation: normal    Rate:     ECG rate:  66    ECG rate assessment: normal    Rhythm:     Rhythm: sinus rhythm    Ectopy:     Ectopy: none    QRS:     QRS axis:  Normal    QRS intervals:  Normal  Conduction:     Conduction: normal    ST segments:     ST segments:  Normal  T waves:     T waves: normal             Phone Contacts  ED Phone Contact    ED Course  ED Course as of Dec 12 0008   Mon Dec 11, 2017   2059  No further events patient feels well, plan will discharge patient agrees with this plan                                MDM  Number of Diagnoses or Management Options  Heart palpitations: new and requires workup     Amount and/or Complexity of Data Reviewed  Decide to obtain previous medical records or to obtain history from someone other than the patient: yes  Obtain history from someone other than the patient: yes  Review and summarize past medical records: yes      CritCare Time    Disposition  Final diagnoses:   Heart palpitations     Time reflects when diagnosis was documented in both MDM as applicable and the Disposition within this note     Time User Action Codes Description Comment    12/11/2017  8:59 PM Odell Saint Add [R00 2] Heart palpitations       ED Disposition     ED Disposition Condition Comment    Discharge  Taryn Marika discharge to home/self care      Condition at discharge: Good        Follow-up Information     Follow up With Specialties Details Why Contact Info    Autumn Antoine MD Family Medicine Call in 1 day To arrange for the next available appointment [address], [address] [facility] [address]  [phone]          Discharge Medication List as of 12/11/2017  9:00 PM      CONTINUE these medications which have NOT CHANGED    Details   amLODIPine (NORVASC) 10 mg tablet Take 10 mg by mouth daily, Historical Med      atenolol-chlorthalidone (TENORETIC) 50-25 mg per tablet Take 1 tablet by mouth daily, Historical Med      levothyroxine 125 mcg tablet Take 125 mcg by mouth daily, Historical Med           No discharge procedures on file      ED Provider  Electronically Signed by           June Amezcua DO  12/12/17 0008

## 2017-12-13 ENCOUNTER — GENERIC CONVERSION - ENCOUNTER (OUTPATIENT)
Dept: FAMILY MEDICINE CLINIC | Facility: MEDICAL CENTER | Age: 77
End: 2017-12-13

## 2017-12-13 ENCOUNTER — HOSPITAL ENCOUNTER (OUTPATIENT)
Dept: NON INVASIVE DIAGNOSTICS | Facility: CLINIC | Age: 77
Discharge: HOME | End: 2017-12-13
Payer: COMMERCIAL

## 2017-12-13 DIAGNOSIS — R01.1 CARDIAC MURMUR: ICD-10-CM

## 2017-12-13 PROCEDURE — 93306 TTE W/DOPPLER COMPLETE: CPT

## 2017-12-15 ENCOUNTER — GENERIC CONVERSION - ENCOUNTER (OUTPATIENT)
Dept: OTHER | Facility: OTHER | Age: 77
End: 2017-12-15

## 2017-12-20 ENCOUNTER — ALLSCRIPTS OFFICE VISIT (OUTPATIENT)
Dept: OTHER | Facility: OTHER | Age: 77
End: 2017-12-20

## 2018-01-02 ENCOUNTER — GENERIC CONVERSION - ENCOUNTER (OUTPATIENT)
Dept: OTHER | Facility: OTHER | Age: 78
End: 2018-01-02

## 2018-01-02 ENCOUNTER — GENERIC CONVERSION - ENCOUNTER (OUTPATIENT)
Dept: FAMILY MEDICINE CLINIC | Facility: MEDICAL CENTER | Age: 78
End: 2018-01-02

## 2018-01-12 VITALS
RESPIRATION RATE: 18 BRPM | BODY MASS INDEX: 30.14 KG/M2 | HEART RATE: 60 BPM | TEMPERATURE: 98.6 F | WEIGHT: 181.13 LBS | SYSTOLIC BLOOD PRESSURE: 164 MMHG | DIASTOLIC BLOOD PRESSURE: 68 MMHG

## 2018-01-12 VITALS
DIASTOLIC BLOOD PRESSURE: 75 MMHG | HEART RATE: 58 BPM | HEIGHT: 65 IN | WEIGHT: 177 LBS | SYSTOLIC BLOOD PRESSURE: 175 MMHG | BODY MASS INDEX: 29.49 KG/M2

## 2018-01-13 VITALS
WEIGHT: 181.5 LBS | BODY MASS INDEX: 30.24 KG/M2 | HEIGHT: 65 IN | SYSTOLIC BLOOD PRESSURE: 160 MMHG | RESPIRATION RATE: 18 BRPM | HEART RATE: 80 BPM | DIASTOLIC BLOOD PRESSURE: 64 MMHG

## 2018-01-13 VITALS
HEIGHT: 65 IN | WEIGHT: 181 LBS | SYSTOLIC BLOOD PRESSURE: 134 MMHG | BODY MASS INDEX: 30.16 KG/M2 | DIASTOLIC BLOOD PRESSURE: 68 MMHG

## 2018-01-14 VITALS
HEART RATE: 58 BPM | BODY MASS INDEX: 29.87 KG/M2 | SYSTOLIC BLOOD PRESSURE: 142 MMHG | HEIGHT: 65 IN | WEIGHT: 179.25 LBS | RESPIRATION RATE: 16 BRPM | DIASTOLIC BLOOD PRESSURE: 82 MMHG

## 2018-01-15 VITALS
HEART RATE: 68 BPM | RESPIRATION RATE: 16 BRPM | SYSTOLIC BLOOD PRESSURE: 148 MMHG | WEIGHT: 181.5 LBS | DIASTOLIC BLOOD PRESSURE: 60 MMHG | BODY MASS INDEX: 30.2 KG/M2

## 2018-01-18 NOTE — PROGRESS NOTES
Assessment    1  Plantar fasciitis (72 71) (M72 2)    Plan  Left foot pain    · * XR FOOT 3+ VIEW LEFT; Status:Resulted - Requires Verification;   Done: 07ZBN6173  10:08AM    Discussion/Summary  Discussion Summary:   As we discussed, the x-ray of the left foot does reveal a heel spur, but it is not likely that this is what is causing your discomfort  The discomfort is likely coming from the plantar fascia area  Continue with your concern continue Naprosyn  Use the cane for support as we discussed, and follow-up with your podiatrist       Chief Complaint    1  Foot Problem  Chief Complaint Free Text Note Form: c/o left foot pain,bottom and arch area , denies injury x 2 days, reports of not using her orthotics x 1 week, used naproxen this AM      History of Present Illness  HPI: Patient is a 48-year-old female nondiabetic who presents with pain in the left foot  It is primarily in the plantar fascia area with extension anteriorly  There is also some left heel discomfort  She has been wearing an insert for the plantar fasciitis  But apparently she has stopped this  We went over the need for continuation of the plantar pressure insert as well as the appropriate use of a cane  Hospital Based Practices Required Assessment:   Pain Assessment   the patient states they have pain  (on a scale of 0 to 10, the patient rates the pain at 8 )   Abuse And Domestic Violence Screen    Yes, the patient is safe at home  The patient states no one is hurting them  Depression And Suicide Screen  No, the patient has not had thoughts of hurting themself  No, the patient has not felt depressed in the past 7 days  Prefered Language is  english  Review of Systems  Focused-Female:   Constitutional: No fever, no chills, feels well, no tiredness, no recent weight gain or loss  ENT: no ear ache, no loss of hearing, no nosebleeds or nasal discharge, no sore throat or hoarseness     Respiratory: no complaints of shortness of breath, no wheezing, no dyspnea on exertion, no orthopnea or PND  Musculoskeletal: as noted in HPI  Integumentary: no complaints of skin rash or lesion, no itching or dry skin, no skin wounds  Active Problems    1  Arthritis (716 90) (M19 90)   2  Decreased hearing (389 9) (H91 90)   3  Flu vaccine need (V04 81) (Z23)   4  Hypertension (401 9) (I10)   5  Hypothyroidism (244 9) (E03 9)   6  Post-menopausal (V49 81) (Z78 0)   7  Pre-diabetes (790 29) (R73 03)   8  Psoriasis (696 1) (L40 9)   9  Routine history and physical examination of adult (V70 0) (Z00 00)   10  Screening for osteoporosis (V82 81) (Z13 820)   11  Skin cancer (173 90) (C44 90)    Past Medical History    1  Former smoker (V15 82) (T50 689)   2  History of Need for 23-polyvalent pneumococcal polysaccharide vaccine (V03 82) (Z23)   3  History of Need for prophylactic vaccination and inoculation against influenza (V04 81)   (Z23)   4  History of Screening for disorder of blood and blood-forming organs (V78 9) (Z13 0)    Family History  Mother    1  Family history of Arthritis (V17 7)   2  Family history of Breast Cancer (V16 3)   3  Family history of Family Health Status Of Mother - Alive   4  Family history of Hypertension (V17 49)   5  Family history of Osteoporosis (V17 81)  Father    10  Family history of Family Health Status Of Father -    9  Family history of Lung Cancer (V16 1)    Social History    · Being A Social Drinker   · Caffeine Use   · Never a smoker    Surgical History    1  History of Blepharoplasty Upper Lid W/ Excessive Skin   2  History of Cholecystectomy   3  History of Thyroid Surgery Total Thyroidectomy    Current Meds   1  AmLODIPine Besylate 10 MG Oral Tablet; TAKE 1 TABLET DAILY; Therapy: 26UUT8069 to (Luke Madden)  Requested for: 13VGY1944; Last   Rx:2016 Ordered   2  Atenolol-Chlorthalidone 50-25 MG Oral Tablet; TAKE 1 TABLET DAILY AS DIRECTED;    Therapy: 23Yae3960 to (Evaluate:2017) Requested for: 85IAF0561; Last   Rx:09Qto6090 Ordered   3  Clobetasol Propionate 0 05 % External Ointment; OINT EX X 21;   Therapy: 68Iuy0199-  Requested for: 02Gnd2283; Status: NEED INFORMATION Ordered   4  Levothyroxine Sodium 112 MCG Oral Tablet; TAKE 1 TABLET DAILY AS DIRECTED; Therapy: 58UHQ1842 to (Maia Alonzo)  Requested for: 11Sep2016; Last   Rx:98Gxd1372 Ordered   5  Multi Vitamin/Minerals Oral Tablet; Take 1 tablet daily Recorded    Allergies    1  Amoxicillin CAPS    Vitals  Signs   Recorded: 95FJF3364 09:52AM   Temperature: 98 9 F, Temporal  Heart Rate: 57  Respiration: 18  Systolic: 143  Diastolic: 77  Height: 5 ft 5 in  Weight: 179 lb   BMI Calculated: 29 79  BSA Calculated: 1 89  O2 Saturation: 96  Pain Scale: 8    Physical Exam    Constitutional   General appearance: No acute distress, well appearing and well nourished  Eyes   Conjunctiva and lids: No swelling, erythema or discharge  Ears, Nose, Mouth, and Throat   External inspection of ears and nose: Normal     Pulmonary   Respiratory effort: No increased work of breathing or signs of respiratory distress  Musculoskeletal   Gait and station: Abnormal   She walks with a slight limp  Digits and nails: Normal without clubbing or cyanosis  Inspection/palpation of joints, bones, and muscles: Normal     Skin   Skin and subcutaneous tissue: Normal without rashes or lesions  Additional Exam:  There is no tenderness in the area of the plantar fascia  There is no tenderness to heal  The left foot is warm and dorsalis pedis is 2+  Results/Data  * XR FOOT 3+ VIEW LEFT 24SVB0551 10:08AM Traci Campos    Order Number: [id number]     Test Name Result Flag Reference   XR FOOT 3+ VW LEFT (Report)     LEFT FOOT     INDICATION: Left foot pain     COMPARISON: None     VIEWS: AP, lateral and oblique ; 3 images     FINDINGS:     There is no acute fracture or dislocation  Plantar calcaneal spur noted   Mild degenerative changes noted at the 2nd through 4th tarsometatarsal joints  No lytic or blastic lesions are seen  Faint calcification is seen in the dorsal aspect of the plantar aponeurosis  IMPRESSION:     Plantar calcaneal spur and evidence of calcific plantar fasciitis  Degenerative changes, mid foot  Workstation performed: [id number]     Signed by:   Ramesh Guzman MD   11/30/16     Diagnostic Studies Reviewed: I personally reviewed the films/images/results in the office today  My interpretation follows  X-ray Review X-ray of the left foot reveals a heel spur DJD  Future Appointments    Date/Time Provider Specialty Site   03/07/2017 10:15 AM KRYSTYNA Gaytan   [address] [facility]     Signatures   Electronically signed by : Geovanna Cordova MD; Nov 30 2016 11:25AM EST                       (Author)

## 2018-01-19 NOTE — PROGRESS NOTES
Assessment   1  Abnormal EKG (794 31) (R94 31)   2  Anxiety (300 00) (F41 9)   3  Shakiness (781 0) (R25 1)    Plan   As above  Blood work is due in March  Has appointment in April  Discussion/Summary      I had a long discussion with patient and her  regarding the symptoms  There is no indication for any cardiac or neurologic problems  It is possible that they are related somewhat to hypoglycemia but suspect it is more anxiety related  Since she is due to see a cardiologist for cardiac clearance prior to her total knee replacement I have asked her to discuss these symptoms with them  History of Present Illness   Sumi Bejarano has been having symptoms  was grocery shopping and started feeling shaky and nervous  Worried she would pass out  No dizziness, nausea, chest pain, sweatiness  She had had her breakfast  Went home and had lunch and felt better  Had panicky feeling  Seen here and had BW and EKG  She had another episode several days ago  Went to ER and had hour long EKG  Diagnosed with anxiety  She said she was prescribed a medication for anxiety but is not taking it  she says she is anxious however  She is anxious about her upcoming total knee replacement  feels very well in general has not had any other episodes of these symptoms  her BP and getting better  Active Problems   1  Abnormal echocardiogram (793 2) (R93 1)   2  Abnormal EKG (794 31) (R94 31)   3  Anxiety (300 00) (F41 9)   4  Arthritis (716 90) (M19 90)   5  Calf pain (729 5) (M79 669)   6  Cardiac murmur (785 2) (R01 1)   7  Colon cancer screening (V76 51) (Z12 11)   8  Decreased hearing (389 9) (H91 90)   9  Dysuria (788 1) (R30 0)   10  Encounter for mammogram to establish baseline mammogram (V76 12) (Z12 31)   11  Flu vaccine need (V04 81) (Z23)   12  Hematuria (599 70) (R31 9)   13  Hypertension (401 9) (I10)   14  Hypothyroidism (244 9) (E03 9)   15  Left foot pain (729 5) (M79 672)   16   Need for vaccination with 13-polyvalent pneumococcal conjugate vaccine (V03 82) (Z23)   17  Pes anserinus tendinitis of right lower extremity (726 61) (M76 891)   18  Plantar fasciitis (728 71) (M72 2)   19  Post-menopausal (V49 81) (Z78 0)   20  Pre-diabetes (790 29) (R73 03)   21  Primary osteoarthritis of right knee (715 16) (M17 11)   22  Psoriasis (696 1) (L40 9)   23  Right calf pain (729 5) (M79 661)   24  Routine history and physical examination of adult (V70 0) (Z00 00)   25  Screening for osteoporosis (V82 81) (Z13 820)   26  Shakiness (781 0) (R25 1)   27  Skin cancer (173 90) (C44 90)   28  Urinary tract infection (599 0) (N39 0)    Past Medical History   1  Former smoker (V15 82) (V18 049)   2  History of Need for 23-polyvalent pneumococcal polysaccharide vaccine (V03 82) (Z23)   3  History of Need for prophylactic vaccination and inoculation against influenza (V04 81)     (Z23)   4  History of Screening for disorder of blood and blood-forming organs (V78 9) (Z13 0)    Surgical History   1  History of Blepharoplasty Upper Lid W/ Excessive Skin   2  History of Cholecystectomy   3  History of Thyroid Surgery Total Thyroidectomy    Family History   Mother    1  Family history of Arthritis (V17 7)   2  Family history of Breast Cancer (V16 3)   3  Family history of Family Health Status Of Mother - Alive   4  Family history of Hypertension (V17 49)   5  Family history of Osteoporosis (V17 81)  Father    10  Family history of Family Health Status Of Father -    9  Family history of Lung Cancer (V16 1)    Social History    · Being A Social Drinker   · Caffeine Use   · Never a smoker    Current Meds    1  AmLODIPine Besylate 10 MG Oral Tablet; TAKE 1 TABLET DAILY; Therapy: 01GNO7365 to (Bryn Clark)  Requested for: 43IJU2926; Last     Rx:2017 Ordered   2  Atenolol-Chlorthalidone 50-25 MG Oral Tablet; TAKE 1 TABLET DAILY AS DIRECTED;      Therapy: 15Yys0617 to (Evaluate:2018)  Requested for: 21Xgi0207; Last     Rx:01Pgw3019 Ordered   3  Clobetasol Propionate 0 05 % External Ointment; OINT EX X 21;     Therapy: 48Zug7488-  Requested for: 21Kqg6046; Status: NEED INFORMATION Ordered   4  Levothyroxine Sodium 112 MCG Oral Tablet; TAKE ONE TABLET BY MOUTH ONCE DAILY     AS DIRECTED; Therapy: 32NIF4221 to (Evaluate:40Vmb6995)  Requested for: 47Qpo9504; Last     Rx:52Nfv2048 Ordered   5  Multi Vitamin/Minerals Oral Tablet; Take 1 tablet daily Recorded   6  Naproxen 500 MG Oral Tablet; Take 1 tablet twice daily as needed; Therapy: 16SFU2263 to (Last Rx:57Igh3454)  Requested for: 07Pko7897 Ordered   7  Vitamin C CAPS; Therapy: (Recorded:25Oct2017) to Recorded    Allergies   1  Amoxicillin CAPS    Vitals   Vital Signs    Recorded: 73Pjx9011 02:12PM   Heart Rate 64   Respiration 16   Systolic 408   Diastolic 60   Weight 654 lb 8 oz   BMI Calculated 30 2   BSA Calculated 1 9     Physical Exam        Constitutional      General appearance: No acute distress, well appearing and well nourished  Neck      Neck: Supple, symmetric, trachea midline, no masses  Thyroid: Normal, no thyromegaly  Pulmonary      Respiratory effort: No increased work of breathing or signs of respiratory distress  Auscultation of lungs: Clear to auscultation  Cardiovascular      Auscultation of heart: Normal rate and rhythm, normal S1 and S2, no murmurs  Carotid pulses: 2+ bilaterally  Abdominal aorta: Normal        Femoral pulses: 2+ bilaterally  Pedal pulses: 2+ bilaterally  Peripheral vascular exam: Normal        Examination of extremities for edema and/or varicosities: Normal        Abdomen      Abdomen: Non-tender, no masses  Liver and spleen: No hepatomegaly or splenomegaly  Lymphatic      Palpation of lymph nodes in neck: No lymphadenopathy         Psychiatric      Mood and affect: Normal        Results/Data   ECHO COMPLETE WITH CONTRAST IF INDICATED 24DUI1881 01:46PM Talat OSORIO Order Number: [id number]       - Patient Instructions: To schedule this appointment, please contact Central Scheduling at [phone]  Test Name Result Flag Reference   ECHO COMPLETE WITH CONTRAST IF INDICATED (Report)     [facility]      [address]      [phone]           Transthoracic Echocardiogram      2D, M-mode, Doppler, and Color Doppler           Study date: 13-Dec-2017           Patient: Kory Sanchez      MR number: [id number]      Account number: [de-identified]      : 1940      Age: 65 years      Gender: Female      Status: Outpatient      Location: [address] [place] [address] and [facility] [place]      Height: 64 in      Weight: 172 7 lb      BP: 124/ 86 mmHg           Indications: Murmur  Diagnoses: R01 1 - Cardiac murmur, unspecified           Sonographer: ASHWIN Holloway      Primary Physician: Lee Ann Anderson MD      Referring Physician: Kay Hinds: [facility]      Interpreting Physician: Tonia Mcmillan MD           SUMMARY           LEFT VENTRICLE:      Systolic function was normal  Ejection fraction was estimated to be 60 %  There were no regional wall motion abnormalities  Doppler parameters were consistent with abnormal left ventricular relaxation (grade 1 diastolic dysfunction)  MITRAL VALVE:      There was mild annular calcification  TRICUSPID VALVE:      There was mild regurgitation  Pulmonary artery systolic pressure was at the upper limits of normal       Estimated peak PA pressure was 35 mmHg  AORTA:      The root exhibited normal size and mild fibrocalcific change  HISTORY: PRIOR HISTORY: Patient has no history of cardiovascular disease  PROCEDURE: The study was performed in the [address]  This was a routine study  The transthoracic approach was used   The study included complete 2D imaging, M-mode, complete spectral Doppler, and color Doppler  Image      quality was adequate  LEFT VENTRICLE: Size was normal  Systolic function was normal  Ejection fraction was estimated to be 60 %  There were no regional wall motion abnormalities  Wall thickness was normal  DOPPLER: There was an increased relative contribution      of atrial contraction to ventricular filling  The pulmonary vein flow pattern was normal  Doppler parameters were consistent with abnormal left ventricular relaxation (grade 1 diastolic dysfunction)  RIGHT VENTRICLE: The size was normal  Systolic function was normal  Wall thickness was normal            LEFT ATRIUM: Size was normal            RIGHT ATRIUM: Size was normal            MITRAL VALVE: There was mild annular calcification  Valve structure was normal  There was normal leaflet separation  DOPPLER: The transmitral velocity was within the normal range  There was no evidence for stenosis  There was trace      regurgitation  AORTIC VALVE: The valve was trileaflet  Leaflets exhibited normal thickness and normal cuspal separation  DOPPLER: Transaortic velocity was within the normal range  There was no evidence for stenosis  There was trace regurgitation  TRICUSPID VALVE: The valve structure was normal  There was normal leaflet separation  DOPPLER: The transtricuspid velocity was within the normal range  There was no evidence for stenosis  There was mild regurgitation  Pulmonary artery      systolic pressure was at the upper limits of normal  Estimated peak PA pressure was 35 mmHg  PULMONIC VALVE: Leaflets exhibited normal thickness, no calcification, and normal cuspal separation  DOPPLER: The transpulmonic velocity was within the normal range  There was trace regurgitation  PERICARDIUM: There was no pericardial effusion  The pericardium was normal in appearance             AORTA: The root exhibited normal size and mild fibrocalcific change  SYSTEMIC VEINS: IVC: The inferior vena cava was normal in size  Respirophasic changes were normal            SYSTEM MEASUREMENT TABLES           2D      %FS: 39 22 %      Ao Diam: 2 48 cm      EDV(Teich): 84 41 ml      EF(Cube): 77 55 %      EF(Teich): 69 97 %      ESV(Cube): 18 22 ml      ESV(Teich): 25 35 ml      IVSd: 1 32 cm      LA Area: 18 02 cm2      LA Diam: 4 08 cm      LVEDV MOD A4C: 100 49 ml      LVEF MOD A4C: 64 37 %      LVESV MOD A4C: 35 81 ml      LVIDd: 4 33 cm      LVIDs: 2 63 cm      LVLd A4C: 7 82 cm      LVLs A4C: 5 69 cm      LVPWd: 1 06 cm      RA Area: 12 11 cm2      RV Diam : 3 39 cm      SI(Cube): 34 2 ml/m2      SI(Teich): 32 1 ml/m2      SV MOD A4C: 64 68 ml      SV(Cube): 62 93 ml      SV(Teich): 59 06 ml           CW      TR Vmax: 2 76 m/s      TR maxP 52 mmHg           MM      TAPSE: 2 62 cm           PW      E': 0 07 m/s      E/E': 12 43      MV A Clay: 1 01 m/s      MV Dec [place]: 3 06 m/s2      MV DecT: 279 26 ms      MV E Clay: 0 85 m/s      MV E/A Ratio: 0 84           [employer] Commission Accredited Echocardiography Laboratory           Prepared and electronically signed by           Emy Serrato MD      Signed 14-Dec-2017 12:17:46      Health Management   Health Maintenance   Medicare Annual Wellness Visit; every 1 year; Next Due: 33ACS4038; Overdue    Future Appointments      Date/Time Provider Specialty Site   2018 01:30 PM KRYSTYNA Melissa   [address] [facility]     Signatures    Electronically signed by : KRYSTYNA Condon ; 2018  1:31AM EST                       (Author)

## 2018-01-23 VITALS
HEART RATE: 64 BPM | RESPIRATION RATE: 18 BRPM | SYSTOLIC BLOOD PRESSURE: 160 MMHG | WEIGHT: 179.38 LBS | DIASTOLIC BLOOD PRESSURE: 76 MMHG | BODY MASS INDEX: 29.85 KG/M2

## 2018-01-23 VITALS
DIASTOLIC BLOOD PRESSURE: 60 MMHG | HEART RATE: 64 BPM | SYSTOLIC BLOOD PRESSURE: 160 MMHG | BODY MASS INDEX: 30.2 KG/M2 | WEIGHT: 181.5 LBS | RESPIRATION RATE: 16 BRPM

## 2018-01-23 NOTE — RESULT NOTES
Verified Results  ECHO COMPLETE WITH CONTRAST IF INDICATED 22Wab9100 01:46PM Tomas Kan Order Number: [id number]    - Patient Instructions: To schedule this appointment, please contact Central Scheduling at [phone]  Test Name Result Flag Reference   ECHO COMPLETE WITH CONTRAST IF INDICATED (Report)     [facility] [address]   [address]   [phone]     Transthoracic Echocardiogram   2D, M-mode, Doppler, and Color Doppler     Study date: 13-Dec-2017     Patient: Rebecca Conner   MR number: [id number]   Account number: [de-identified]   : 1940   Age: 65 years   Gender: Female   Status: Outpatient   Location: [address] [place] [address] [facility] [place]   Height: 64 in   Weight: 172 7 lb   BP: 124/ 86 mmHg     Indications: Murmur  Diagnoses: R01 1 - Cardiac murmur, unspecified     Sonographer: ASHWIN Staton   Primary Physician: Rohan East MD   Referring Physician: Lamberto Strong: [facility]   Interpreting Physician: Kolton Pizano MD     SUMMARY     LEFT VENTRICLE:   Systolic function was normal  Ejection fraction was estimated to be 60 %  There were no regional wall motion abnormalities  Doppler parameters were consistent with abnormal left ventricular relaxation (grade 1 diastolic dysfunction)  MITRAL VALVE:   There was mild annular calcification  TRICUSPID VALVE:   There was mild regurgitation  Pulmonary artery systolic pressure was at the upper limits of normal    Estimated peak PA pressure was 35 mmHg  AORTA:   The root exhibited normal size and mild fibrocalcific change  HISTORY: PRIOR HISTORY: Patient has no history of cardiovascular disease  PROCEDURE: The study was performed in the [address] [facility] [place]  This was a routine study  The transthoracic approach was used  The study included complete 2D imaging, M-mode, complete spectral Doppler, and color Doppler  Image   quality was adequate  LEFT VENTRICLE: Size was normal  Systolic function was normal  Ejection fraction was estimated to be 60 %  There were no regional wall motion abnormalities  Wall thickness was normal  DOPPLER: There was an increased relative contribution   of atrial contraction to ventricular filling  The pulmonary vein flow pattern was normal  Doppler parameters were consistent with abnormal left ventricular relaxation (grade 1 diastolic dysfunction)  RIGHT VENTRICLE: The size was normal  Systolic function was normal  Wall thickness was normal      LEFT ATRIUM: Size was normal      RIGHT ATRIUM: Size was normal      MITRAL VALVE: There was mild annular calcification  Valve structure was normal  There was normal leaflet separation  DOPPLER: The transmitral velocity was within the normal range  There was no evidence for stenosis  There was trace   regurgitation  AORTIC VALVE: The valve was trileaflet  Leaflets exhibited normal thickness and normal cuspal separation  DOPPLER: Transaortic velocity was within the normal range  There was no evidence for stenosis  There was trace regurgitation  TRICUSPID VALVE: The valve structure was normal  There was normal leaflet separation  DOPPLER: The transtricuspid velocity was within the normal range  There was no evidence for stenosis  There was mild regurgitation  Pulmonary artery   systolic pressure was at the upper limits of normal  Estimated peak PA pressure was 35 mmHg  PULMONIC VALVE: Leaflets exhibited normal thickness, no calcification, and normal cuspal separation  DOPPLER: The transpulmonic velocity was within the normal range  There was trace regurgitation  PERICARDIUM: There was no pericardial effusion  The pericardium was normal in appearance  AORTA: The root exhibited normal size and mild fibrocalcific change  SYSTEMIC VEINS: IVC: The inferior vena cava was normal in size   Respirophasic changes were normal      SYSTEM MEASUREMENT TABLES     2D   %FS: 39 22 %   Ao Diam: 2 48 cm   EDV(Teich): 84 41 ml   EF(Cube): 77 55 %   EF(Teich): 69 97 %   ESV(Cube): 18 22 ml   ESV(Teich): 25 35 ml   IVSd: 1 32 cm   LA Area: 18 02 cm2   LA Diam: 4 08 cm   LVEDV MOD A4C: 100 49 ml   LVEF MOD A4C: 64 37 %   LVESV MOD A4C: 35 81 ml   LVIDd: 4 33 cm   LVIDs: 2 63 cm   LVLd A4C: 7 82 cm   LVLs A4C: 5 69 cm   LVPWd: 1 06 cm   RA Area: 12 11 cm2   RV Diam : 3 39 cm   SI(Cube): 34 2 ml/m2   SI(Teich): 32 1 ml/m2   SV MOD A4C: 64 68 ml   SV(Cube): 62 93 ml   SV(Teich): 59 06 ml     CW   TR Vmax: 2 76 m/s   TR maxP 52 mmHg     MM   TAPSE: 2 62 cm     PW   E': 0 07 m/s   E/E': 12 43   MV A Clay: 1 01 m/s   MV Dec [place]: 3 06 m/s2   MV DecT: 279 26 ms   MV E Clay: 0 85 m/s   MV E/A Ratio: 0 84     [employer]oci[employer] Commission Accredited Echocardiography Laboratory     Prepared and electronically signed by     Romeo Galdamez MD   Signed 14-Dec-2017 12:17:46

## 2018-01-23 NOTE — RESULT NOTES
Verified Results  (1) BASIC METABOLIC PROFILE [id number] 11:51AM Norva Rust Order Number: [id number]     Test Name Result Flag Reference   GLUCOSE,RANDM 120 mg/dL     If the patient is fasting, the ADA then defines impaired fasting glucose as > 100 mg/dL and diabetes as > or equal to 123 mg/dL  Specimen collection should occur prior to Sulfasalazine administration due to the potential for falsely depressed results  Specimen collection should occur prior to Sulfapyridine administration due to the potential for falsely elevated results  SODIUM 133 mmol/L L 136-145   POTASSIUM 3 6 mmol/L  3 5-5 3   CHLORIDE 97 mmol/L L 100-108   CARBON DIOXIDE 30 mmol/L  21-32   ANION GAP (CALC) 6 mmol/L  4-13   BLOOD UREA NITROGEN 22 mg/dL  5-25   CREATININE 0 80 mg/dL  0 60-1 30   Standardized to IDMS reference method   CALCIUM 9 6 mg/dL  8 3-10 1   eGFR 71 ml/min/1 73sq m     [facility] Disease Education Program recommendations are as follows:  GFR calculation is accurate only with a steady state creatinine  Chronic Kidney disease less than 60 ml/min/1 73 sq  meters  Kidney failure less than 15 ml/min/1 73 sq  meters  (1) TSH [id number] 11:51AM [employer] [facility] Order Number: [id number]     Test Name Result Flag Reference   TSH 0 407 uIU/mL  0 358-3 740   Patients undergoing fluorescein dye angiography may retain small amounts of fluorescein in the body for 48-72 hours post procedure  Samples containing fluorescein can produce falsely depressed TSH values  If the patient had this procedure,a specimen should be resubmitted post fluorescein clearance            The recommended reference ranges for TSH during pregnancy are as follows:  First trimester 0 1 to 2 5 uIU/mL  Second trimester  0 2 to 3 0 uIU/mL  Third trimester 0 3 to 3 0 uIU/m     (1) T4, FREE [id number] 11:51AM Norva Rust Order Number: [id number]     Test Name Result Flag Reference   T4,FREE 1 40 ng/dL 0  76-1 46   Specimen collection should occur prior to Sulfasalazine administration due to the potential for falsely elevated results

## 2018-02-07 DIAGNOSIS — E03.9 HYPOTHYROIDISM, UNSPECIFIED TYPE: Primary | ICD-10-CM

## 2018-02-07 RX ORDER — LEVOTHYROXINE SODIUM 112 UG/1
1 TABLET ORAL DAILY
COMMUNITY
Start: 2012-01-04 | End: 2018-02-07 | Stop reason: SDUPTHER

## 2018-02-07 RX ORDER — LEVOTHYROXINE SODIUM 112 UG/1
112 TABLET ORAL DAILY
Qty: 90 TABLET | Refills: 1 | Status: SHIPPED | OUTPATIENT
Start: 2018-02-07

## 2018-02-07 NOTE — TELEPHONE ENCOUNTER
Needs Rx sent to the [facility] in [place] for a 90 day supply

## 2018-03-01 DIAGNOSIS — R73.03 PREDIABETES: ICD-10-CM

## 2018-03-01 DIAGNOSIS — I10 ESSENTIAL (PRIMARY) HYPERTENSION: ICD-10-CM

## 2018-03-01 DIAGNOSIS — E03.9 HYPOTHYROIDISM: ICD-10-CM

## 2020-09-14 ENCOUNTER — TELEPHONE (OUTPATIENT)
Dept: FAMILY MEDICINE CLINIC | Facility: MEDICAL CENTER | Age: 80
End: 2020-09-14

## 2020-09-14 NOTE — TELEPHONE ENCOUNTER
Spoke with patient regarding the request for records from chart squad  She fell on a side walk and fractured her jaw a few years ago, was treated at [facility] and did meet with [employer] that is handling the law suit  She states that she did sign a release of records while at the law firm for them and chart squad  She tells me that she has not had any contact with the law office and is not aware of who her  would be there  She does not want the records herself and suggested that I could call the law firm to see if they still needed them  LM for Tanya Maravilla to discuss

## 2025-06-06 ENCOUNTER — ESTABLISHED COMPREHENSIVE EXAM (OUTPATIENT)
Dept: URBAN - METROPOLITAN AREA CLINIC 6 | Facility: CLINIC | Age: 85
End: 2025-06-06

## 2025-06-06 DIAGNOSIS — H35.3124: ICD-10-CM

## 2025-06-06 DIAGNOSIS — H43.813: ICD-10-CM

## 2025-06-06 DIAGNOSIS — H35.3113: ICD-10-CM

## 2025-06-06 PROCEDURE — 92250 FUNDUS PHOTOGRAPHY W/I&R: CPT

## 2025-06-06 PROCEDURE — 92134 CPTRZ OPH DX IMG PST SGM RTA: CPT | Mod: NC

## 2025-06-06 PROCEDURE — 92014 COMPRE OPH EXAM EST PT 1/>: CPT

## 2025-06-06 PROCEDURE — 92202 OPSCPY EXTND ON/MAC DRAW: CPT | Mod: NC

## 2025-06-06 ASSESSMENT — TONOMETRY
OS_IOP_MMHG: 10
OD_IOP_MMHG: 10

## 2025-06-06 ASSESSMENT — VISUAL ACUITY
OS_CC: CF 2FT
OD_CC: 20/50